# Patient Record
Sex: FEMALE | Race: WHITE | NOT HISPANIC OR LATINO | Employment: FULL TIME | URBAN - METROPOLITAN AREA
[De-identification: names, ages, dates, MRNs, and addresses within clinical notes are randomized per-mention and may not be internally consistent; named-entity substitution may affect disease eponyms.]

---

## 2023-10-26 DIAGNOSIS — D24.1 FIBROADENOMA OF RIGHT BREAST: Primary | ICD-10-CM

## 2023-10-26 PROBLEM — N63.15 BREAST LUMP ON RIGHT SIDE AT 9 O'CLOCK POSITION: Status: ACTIVE | Noted: 2023-10-26

## 2023-10-26 PROBLEM — Z80.3 FAMILY HISTORY OF MALIGNANT NEOPLASM OF BREAST: Status: ACTIVE | Noted: 2023-10-26

## 2023-10-27 NOTE — PROGRESS NOTES
[facility]  Eddie Gonzalez MD, FACS  Sade Fajardo NP-C      Date of Service: 11/3/2023      Chief Complaint:   Lory Dorsey is a 47 y.o. female presenting today for her annual evaluation.  She is due for a mammogram. She reports no interval changes.     History of Present Illness:   Mrs. Dorsey first presented on April 10, 2018 due to a right breast mass and sonocore biopsy showed a benign fibroadenoma. Her JAROD was calculated in  and found to give her a 16.3 % Lifetime Risk of Breast Cancer.  MD::: Iain López MD.    Past Medical History:   Diagnosis Date    ADHD     Anxiety     Asthma     Breast lump on right side at 9 o'clock position 10/26/2023    COVID-19     Family history of malignant neoplasm of breast 10/26/2023    Fibroadenoma of right breast 10/26/2023    Heart problem     History of abnormal cervical Pap smear     History of abnormal mammogram     Mitral valve prolapse     Cris-Parkinson-White syndrome       Past Surgical History:   Procedure Laterality Date    BREAST BIOPSY Right     2018    CARDIAC ELECTROPHYSIOLOGY MAPPING AND ABLATION  2022     SECTION  2003     SECTION  2007    CHOLECYSTECTOMY      COLD KNIFE CONIZATION OF CERVIX  1997    DIAGNOSTIC LAPAROSCOPY      X2    DILATION AND CURETTAGE OF UTERUS  2018    DDUB    DILATION AND CURETTAGE OF UTERUS  2022    TOTAL REDUCTION MAMMOPLASTY              Current Outpatient Medications:     ALPRAZolam (XANAX) 0.25 MG tablet, Take 1 tablet (0.25 mg total) by mouth nightly as needed for Anxiety., Disp: 30 tablet, Rfl: 0    chlorthalidone (HYGROTEN) 25 MG Tab, TAKE ONE TABLET BY MOUTH IN THE MORNING THANK YOU, Disp: , Rfl:     sertraline (ZOLOFT) 50 MG tablet, Take 50 mg by mouth., Disp: , Rfl:     buPROPion (WELLBUTRIN XL) 300 MG 24 hr tablet, Take 1 tablet by mouth every morning., Disp: , Rfl:    Review of patient's allergies  indicates:  No Known Allergies   Social History     Tobacco Use    Smoking status: Never    Smokeless tobacco: Never   Substance Use Topics    Alcohol use: Yes      Family History   Problem Relation Age of Onset    Hypertension Mother     Diabetes Father     Heart disease Father     Heart disease Brother     Lung cancer Maternal Grandfather     Heart disease Maternal Grandfather     Heart disease Paternal Grandmother     Heart disease Paternal Grandfather     Breast cancer Paternal Aunt         dx 60's    Ovarian cancer Neg Hx         Review of Systems   Integumentary:  Negative for color change, rash, mole/lesion, breast mass, breast discharge and breast tenderness.   Breast: Negative for mass and tenderness       Physical Exam   HENT:   Head: Normocephalic.   Pulmonary/Chest: Right breast exhibits no inverted nipple, no mass, no nipple discharge, no skin change and no tenderness. Left breast exhibits no inverted nipple, no mass, no nipple discharge, no skin change and no tenderness. No breast swelling.   Genitourinary: No breast swelling.   Musculoskeletal: Lymphadenopathy:      Upper Body:      Right upper body: No supraclavicular or axillary adenopathy.      Left upper body: No supraclavicular or axillary adenopathy.     Neurological: She is alert.        MAMMOGRAM REPORT: There is a biopsy clip seen in the right breast. There are post-surgical findings from a previous breast reduction seen in both breasts.   ASSESSMENT and PLAN OF CARE     1. Fibroadenoma of right breast  Assessment & Plan:  We reviewed our findings today and her questions were answered.  She understands that her imaging and exams have remained stable (and show nothing concerning).  She is comfortable being followed in a conservative fashion.      She understands the importance of monthly self-breast examination and knows to report any and all changes as they occur.        2. Breast lump on right side at 9 o'clock position  Assessment &  Plan:  Same as above      3. Family history of malignant neoplasm of breast  Assessment & Plan:  We discussed her family history and how it could impact her own future risks.  We discussed family vs. genetic history and the importance and implications of each.  All questions answered to her satisfaction.  She knows that as additional family members are diagnosed - she will need to let us know as this may change follow up and imaging recommendations.    We had a discussion concerning Breast Cancer Risk Reduction and current NCCN Guidelines. She knows that her risk can be lowered slightly with a healthy lifestyle and minimal ETOH use. Being physically active will also help. She should reduce or stay away from OCPs and HRT as possible.         Medical Decision Making: It is my impression that this patient suffers all conditions contained in this medical document.  Each of these conditions did affect our plan of care and my medical decision making today.  It is my opinion that the medical decision making concerning this patient was of minimal  difficulty based on the aforementioned conditions.  Any further recommendations will be communicated to the patient by me.  I have reviewed and verified her allergies, list of medications, medical and surgical histories, social history, and a pertinent review of symptoms.     Follow up: 1 year and PRN    For: IGOR BRITTON) at [facility]

## 2023-11-03 ENCOUNTER — OFFICE VISIT (OUTPATIENT)
Dept: SURGERY | Facility: CLINIC | Age: 48
End: 2023-11-03
Payer: COMMERCIAL

## 2023-11-03 DIAGNOSIS — N63.15 BREAST LUMP ON RIGHT SIDE AT 9 O'CLOCK POSITION: ICD-10-CM

## 2023-11-03 DIAGNOSIS — Z80.3 FAMILY HISTORY OF MALIGNANT NEOPLASM OF BREAST: ICD-10-CM

## 2023-11-03 DIAGNOSIS — D24.1 FIBROADENOMA OF RIGHT BREAST: ICD-10-CM

## 2023-11-03 PROCEDURE — 99213 PR OFFICE/OUTPT VISIT, EST, LEVL III, 20-29 MIN: ICD-10-PCS | Mod: S$GLB,,, | Performed by: NURSE PRACTITIONER

## 2023-11-03 PROCEDURE — 99999 PR PBB SHADOW E&M-EST. PATIENT-LVL III: ICD-10-PCS | Mod: PBBFAC,,, | Performed by: NURSE PRACTITIONER

## 2023-11-03 PROCEDURE — 99999 PR PBB SHADOW E&M-EST. PATIENT-LVL III: CPT | Mod: PBBFAC,,, | Performed by: NURSE PRACTITIONER

## 2023-11-03 PROCEDURE — 1159F PR MEDICATION LIST DOCUMENTED IN MEDICAL RECORD: ICD-10-PCS | Mod: CPTII,S$GLB,, | Performed by: NURSE PRACTITIONER

## 2023-11-03 PROCEDURE — 1159F MED LIST DOCD IN RCRD: CPT | Mod: CPTII,S$GLB,, | Performed by: NURSE PRACTITIONER

## 2023-11-03 PROCEDURE — 99213 OFFICE O/P EST LOW 20 MIN: CPT | Mod: S$GLB,,, | Performed by: NURSE PRACTITIONER

## 2023-11-03 PROCEDURE — 1160F RVW MEDS BY RX/DR IN RCRD: CPT | Mod: CPTII,S$GLB,, | Performed by: NURSE PRACTITIONER

## 2023-11-03 PROCEDURE — 1160F PR REVIEW ALL MEDS BY PRESCRIBER/CLIN PHARMACIST DOCUMENTED: ICD-10-PCS | Mod: CPTII,S$GLB,, | Performed by: NURSE PRACTITIONER

## 2024-10-28 DIAGNOSIS — D24.1 FIBROADENOMA OF RIGHT BREAST: Primary | ICD-10-CM

## 2024-11-07 NOTE — PROGRESS NOTES
[facility]  Eddie Gonzalez MD, FACS  Sade Fajardo NP-MARTHA      Date of Service: 2024      Chief Complaint:   Lory Dorsey is a 48 y.o. female presenting today for her annual evaluation.  She is due for a mammogram. She reports no interval changes.     History of Present Illness:   Mrs. Dorsey first presented on April 10, 2018 due to a right breast mass and sonocore biopsy showed a benign fibroadenoma. Her JAROD was calculated in  and found to give her a 16.3 % Lifetime Risk of Breast Cancer.  MD::: Iain López MD.    Past Medical History:   Diagnosis Date    ADHD     Anxiety     Asthma     Breast lump on right side at 9 o'clock position 10/26/2023    COVID-19     Family history of malignant neoplasm of breast 10/26/2023    Fibroadenoma of right breast 10/26/2023    Heart problem     History of abnormal cervical Pap smear     History of abnormal mammogram     Mitral valve prolapse     Cris-Parkinson-White syndrome       Past Surgical History:   Procedure Laterality Date    BREAST BIOPSY Right     2018    CARDIAC ELECTROPHYSIOLOGY MAPPING AND ABLATION  2022     SECTION  2003     SECTION  2007    CHOLECYSTECTOMY      COLD KNIFE CONIZATION OF CERVIX  1997    DIAGNOSTIC LAPAROSCOPY      X2    DILATION AND CURETTAGE OF UTERUS  2018    DDUB    DILATION AND CURETTAGE OF UTERUS  2022    TOTAL REDUCTION MAMMOPLASTY              Current Outpatient Medications:     ALPRAZolam (XANAX) 0.25 MG tablet, Take 1 tablet (0.25 mg total) by mouth nightly as needed for Anxiety., Disp: 30 tablet, Rfl: 0    buPROPion (WELLBUTRIN XL) 300 MG 24 hr tablet, Take 1 tablet (300 mg total) by mouth every morning., Disp: 30 tablet, Rfl: 0    chlorthalidone (HYGROTEN) 25 MG Tab, TAKE ONE TABLET BY MOUTH IN THE MORNING THANK YOU, Disp: , Rfl:     sertraline (ZOLOFT) 50 MG tablet, Take 50 mg by mouth., Disp: , Rfl:    Review of  patient's allergies indicates:  No Known Allergies   Social History     Tobacco Use    Smoking status: Never    Smokeless tobacco: Never   Substance Use Topics    Alcohol use: Yes      Family History   Problem Relation Name Age of Onset    Hypertension Mother      Diabetes Father      Heart disease Father      Heart disease Brother      Lung cancer Maternal Grandfather      Heart disease Maternal Grandfather      Heart disease Paternal Grandmother      Heart disease Paternal Grandfather      Breast cancer Paternal Aunt          dx 60's    Ovarian cancer Neg Hx          Review of Systems   Integumentary:  Negative for color change, rash, mole/lesion, breast mass, breast discharge and breast tenderness.   Breast: Negative for mass and tenderness       Physical Exam   HENT:   Head: Normocephalic.   Pulmonary/Chest: Right breast exhibits no inverted nipple, no mass, no nipple discharge, no skin change and no tenderness. Left breast exhibits no inverted nipple, no mass, no nipple discharge, no skin change and no tenderness. No breast swelling.   Genitourinary: No breast swelling.   Musculoskeletal: Lymphadenopathy:      Upper Body:      Right upper body: No supraclavicular or axillary adenopathy.      Left upper body: No supraclavicular or axillary adenopathy.     Neurological: She is alert.        MAMMOGRAM REPORT: She has some diffuse fibronodular tissue; there are no spiculated lesions, distortions or suspicious calcifications noted; NEM    ASSESSMENT and PLAN OF CARE     1. Fibroadenoma of right breast  Assessment & Plan:  We reviewed our findings today and her questions were answered.  She understands that her imaging and exams have remained stable (and show nothing concerning).  She is comfortable being followed in a conservative fashion.      She understands the importance of monthly self-breast examination and knows to report any and all changes as they occur.        2. Breast lump on right side at 9 o'clock  position  Assessment & Plan:  Same as above      3. Family history of malignant neoplasm of breast  Assessment & Plan:  We discussed her family history and how it could impact her own future risks.  We discussed family vs. genetic history and the importance and implications of each.  All questions answered to her satisfaction.  She knows that as additional family members are diagnosed - she will need to let us know as this may change follow up and imaging recommendations.    We had a discussion concerning Breast Cancer Risk Reduction and current NCCN Guidelines. She knows that her risk can be lowered slightly with a healthy lifestyle and minimal ETOH use. Being physically active will also help. She should reduce or stay away from OCPs and HRT as possible.         Medical Decision Making: It is my impression that this patient suffers all conditions contained in this medical document.  Each of these conditions did affect our plan of care and my medical decision making today.  It is my opinion that the medical decision making concerning this patient was of minimal  difficulty based on the aforementioned conditions.  Any further recommendations will be communicated to the patient by me.  I have reviewed and verified her allergies, list of medications, medical and surgical histories, social history, and a pertinent review of symptoms.     Follow up: 1 year and PRN    For: Physical Examination and MGM (D) at [facility]

## 2024-11-08 ENCOUNTER — OFFICE VISIT (OUTPATIENT)
Dept: SURGERY | Facility: CLINIC | Age: 49
End: 2024-11-08
Payer: COMMERCIAL

## 2024-11-08 DIAGNOSIS — D24.1 FIBROADENOMA OF RIGHT BREAST: Primary | ICD-10-CM

## 2024-11-08 DIAGNOSIS — Z80.3 FAMILY HISTORY OF MALIGNANT NEOPLASM OF BREAST: ICD-10-CM

## 2024-11-08 DIAGNOSIS — N63.15 BREAST LUMP ON RIGHT SIDE AT 9 O'CLOCK POSITION: ICD-10-CM

## 2024-11-08 PROCEDURE — 99999 PR PBB SHADOW E&M-EST. PATIENT-LVL III: CPT | Mod: PBBFAC,,, | Performed by: NURSE PRACTITIONER

## 2025-07-17 ENCOUNTER — PATIENT MESSAGE (OUTPATIENT)
Dept: SURGERY | Facility: CLINIC | Age: 50
End: 2025-07-17
Payer: COMMERCIAL